# Patient Record
Sex: FEMALE | Race: AMERICAN INDIAN OR ALASKA NATIVE | HISPANIC OR LATINO
[De-identification: names, ages, dates, MRNs, and addresses within clinical notes are randomized per-mention and may not be internally consistent; named-entity substitution may affect disease eponyms.]

---

## 2020-11-10 ENCOUNTER — RX ONLY (RX ONLY)
Age: 60
End: 2020-11-10

## 2023-05-08 ENCOUNTER — APPOINTMENT (OUTPATIENT)
Dept: URBAN - NONMETROPOLITAN AREA SURGERY 8 | Age: 63
Setting detail: DERMATOLOGY
End: 2023-05-08

## 2023-05-08 DIAGNOSIS — L71.8 OTHER ROSACEA: ICD-10-CM

## 2023-05-08 DIAGNOSIS — D485 NEOPLASM OF UNCERTAIN BEHAVIOR OF SKIN: ICD-10-CM

## 2023-05-08 PROBLEM — D48.5 NEOPLASM OF UNCERTAIN BEHAVIOR OF SKIN: Status: ACTIVE | Noted: 2023-05-08

## 2023-05-08 PROCEDURE — 11102 TANGNTL BX SKIN SINGLE LES: CPT

## 2023-05-08 PROCEDURE — OTHER PRESCRIPTION: OTHER

## 2023-05-08 PROCEDURE — OTHER COUNSELING: OTHER

## 2023-05-08 PROCEDURE — 99204 OFFICE O/P NEW MOD 45 MIN: CPT | Mod: 25

## 2023-05-08 PROCEDURE — OTHER MIPS QUALITY: OTHER

## 2023-05-08 PROCEDURE — OTHER BIOPSY BY SHAVE METHOD: OTHER

## 2023-05-08 RX ORDER — METRONIDAZOLE 10 MG/G
GEL TOPICAL QHS
Qty: 60 | Refills: 11 | Status: ERX | COMMUNITY
Start: 2023-05-08

## 2023-05-08 ASSESSMENT — LOCATION SIMPLE DESCRIPTION DERM
LOCATION SIMPLE: RIGHT THUMB
LOCATION SIMPLE: LEFT CHEEK

## 2023-05-08 ASSESSMENT — LOCATION DETAILED DESCRIPTION DERM
LOCATION DETAILED: LEFT CENTRAL MALAR CHEEK
LOCATION DETAILED: PERIUNGUAL SKIN RIGHT THUMB

## 2023-05-08 ASSESSMENT — LOCATION ZONE DERM
LOCATION ZONE: FACE
LOCATION ZONE: FINGER

## 2023-05-08 NOTE — PROCEDURE: MIPS QUALITY
Quality 110: Preventive Care And Screening: Influenza Immunization: Influenza Immunization previously received during influenza season
Quality 402: Tobacco Use And Help With Quitting Among Adolescents: Patient screened for tobacco and never smoked
Quality 130: Documentation Of Current Medications In The Medical Record: Current Medications Documented
Quality 226: Preventive Care And Screening: Tobacco Use: Screening And Cessation Intervention: Patient screened for tobacco use and is an ex/non-smoker
Quality 431: Preventive Care And Screening: Unhealthy Alcohol Use - Screening: Patient not identified as an unhealthy alcohol user when screened for unhealthy alcohol use using a systematic screening method
Detail Level: Detailed

## 2023-05-08 NOTE — PROCEDURE: COUNSELING
Moisturizer Recommendations: AM Use: Cerave AM, Cetaphil Oil Control or any other oil-free facial moisturizer with SPF 30+. Sunscreen should be applied each and every day, regardless of the weather or activity level\\nPM Use: Cerave PM can be applied over topical medications (wait 15 minutes between applications)
Laser Recommendations: Vascular lasers that target background redness or telangectasias may be recommended. These lasers are often not covered by insurance but may be covered by some FSA or HSA plans .\\nOther types of laser therapies may be used to manage textural changes of the skin, due to rosacea.
Sunscreen Recommendations: Facial Sunscreens as noted above. Sunscreen should be applied daily and then q 80 minutes to exposed areas. There are many different sunscreen products on the market and may be found in a cream/spray/lotion/stick/powder. Use whatever product that feels good on your skin.
Detail Level: Zone
Cleanser Recommendations: Cerave Gentle Cleanser\\nCerave Foaming Cleanser\\nIf instructed, a benzoyl peroxide wash or salacylic acid wash can be purchased over the counter
Ipl Recommendations: Intense Pulse LIght or Broad Band Light can be used as needed to manage background erythema. The frequency of treatment will depend on desired goals. these light therapies need to be repeated over time. the light therapies are often not covered by insurance but may be covered by some FSA or HSA plans
Topical Retinoids Recommendations: For some with rosacea, retinoid based products may be irritating and to be used with caution. Mechanical scrubs and exfoliants may be irritating to the skin and should be used with caution.
Detail Level: Simple

## 2023-05-31 ENCOUNTER — APPOINTMENT (OUTPATIENT)
Dept: URBAN - NONMETROPOLITAN AREA SURGERY 8 | Age: 63
Setting detail: DERMATOLOGY
End: 2023-06-05

## 2023-05-31 DIAGNOSIS — L60.8 OTHER NAIL DISORDERS: ICD-10-CM

## 2023-05-31 DIAGNOSIS — B07.8 OTHER VIRAL WARTS: ICD-10-CM

## 2023-05-31 PROCEDURE — OTHER MIPS QUALITY: OTHER

## 2023-05-31 PROCEDURE — OTHER ADDITIONAL NOTES: OTHER

## 2023-05-31 PROCEDURE — OTHER COUNSELING: OTHER

## 2023-05-31 PROCEDURE — 99212 OFFICE O/P EST SF 10 MIN: CPT

## 2023-05-31 ASSESSMENT — LOCATION ZONE DERM: LOCATION ZONE: FINGERNAIL

## 2023-05-31 ASSESSMENT — LOCATION SIMPLE DESCRIPTION DERM: LOCATION SIMPLE: RIGHT THUMBNAIL

## 2023-05-31 ASSESSMENT — LOCATION DETAILED DESCRIPTION DERM: LOCATION DETAILED: RIGHT THUMBNAIL

## 2023-05-31 NOTE — PROCEDURE: ADDITIONAL NOTES
Additional Notes: Pt advised to use gloves for protection, use white vinegar and/or acetic acid and alcohol for maintenance.
Detail Level: Simple
Render Risk Assessment In Note?: no
Additional Notes: Will recheck and biopsy in 1 month.